# Patient Record
Sex: FEMALE | Race: WHITE | Employment: STUDENT | URBAN - METROPOLITAN AREA
[De-identification: names, ages, dates, MRNs, and addresses within clinical notes are randomized per-mention and may not be internally consistent; named-entity substitution may affect disease eponyms.]

---

## 2020-09-01 ENCOUNTER — HOSPITAL ENCOUNTER (EMERGENCY)
Age: 20
Discharge: HOME | End: 2020-09-01
Attending: STUDENT IN AN ORGANIZED HEALTH CARE EDUCATION/TRAINING PROGRAM
Payer: COMMERCIAL

## 2020-09-01 VITALS
SYSTOLIC BLOOD PRESSURE: 134 MMHG | HEART RATE: 87 BPM | TEMPERATURE: 98 F | OXYGEN SATURATION: 99 % | WEIGHT: 114.4 LBS | RESPIRATION RATE: 16 BRPM | DIASTOLIC BLOOD PRESSURE: 88 MMHG

## 2020-09-01 PROCEDURE — 99282 EMERGENCY DEPT VISIT SF MDM: CPT

## 2020-09-01 PROCEDURE — 6360000002 HC RX W HCPCS: Performed by: STUDENT IN AN ORGANIZED HEALTH CARE EDUCATION/TRAINING PROGRAM

## 2020-09-01 PROCEDURE — 90375 RABIES IG IM/SC: CPT | Performed by: STUDENT IN AN ORGANIZED HEALTH CARE EDUCATION/TRAINING PROGRAM

## 2020-09-01 PROCEDURE — 6360000002 HC RX W HCPCS

## 2020-09-01 PROCEDURE — 90375 RABIES IG IM/SC: CPT

## 2020-09-01 PROCEDURE — 90471 IMMUNIZATION ADMIN: CPT | Performed by: STUDENT IN AN ORGANIZED HEALTH CARE EDUCATION/TRAINING PROGRAM

## 2020-09-01 PROCEDURE — 90675 RABIES VACCINE IM: CPT | Performed by: STUDENT IN AN ORGANIZED HEALTH CARE EDUCATION/TRAINING PROGRAM

## 2020-09-01 PROCEDURE — 96372 THER/PROPH/DIAG INJ SC/IM: CPT

## 2020-09-01 RX ADMIN — RABIES IMMUNE GLOBULIN (HUMAN) 1035 UNITS: 300 INJECTION, SOLUTION INFILTRATION; INTRAMUSCULAR at 21:19

## 2020-09-01 RX ADMIN — Medication 1 ML: at 21:16

## 2020-09-01 ASSESSMENT — ENCOUNTER SYMPTOMS
VOMITING: 0
NAUSEA: 0

## 2020-09-01 NOTE — ED NOTES
Pt states that she had a bat in her house, her PCP wants them to be vaccinated      Marlyn Salgado RN  09/01/20 1926

## 2020-09-01 NOTE — ED PROVIDER NOTES
[address] [address]  Dept: [phone]  Loc: [phone]  eMERGENCYdEPARTMENT eNCOUnter      Pt Name: Felix Au  MRN: [id number]  Nellygfmarta 2000  Date of evaluation: 9/1/2020  Provider:Liset Fan PA-C    CHIEF COMPLAINT       Chief Complaint   Patient presents with    Other     needs rabies treatment      HISTORY OF PRESENT ILLNESS  (Location/Symptom, Timing/Onset, Context/Setting, Quality, Duration,Modifying Factors, Severity.)   Felix Au is a 23 y.o. female who presents to the emergency department by private vehicle requesting treatment for rabies exposure. Patient states there was a bat in her house for couple days over the weekend. They were able to catch and release the back. She denies any known bite from that. She contacted the health department and her PCP. Both parties directed her to the emergency department to be treated for rabies exposure. Patient feels well otherwise. No other complaints at this time. Nursing Notes were reviewedand agreed with or any disagreements were addressed in the HPI. REVIEW OF SYSTEMS    (2-9 systems for level 4, 10 or more for level 5)     Review of Systems   Constitutional: Negative for chills and fever. HENT: Negative. Gastrointestinal: Negative for nausea and vomiting. Musculoskeletal: Negative for arthralgias and myalgias. Skin: Negative for wound. Neurological: Negative. Psychiatric/Behavioral: Negative for behavioral problems and confusion. Except as noted above the remainder of the review of systems was reviewed and negative. PAST MEDICAL HISTORY   History reviewed. No pertinent past medical history. SURGICAL HISTORY     History reviewed. No pertinent surgical history. CURRENT MEDICATIONS     [unfilled]    ALLERGIES     Patient has no known allergies. FAMILY HISTORY     History reviewed.  No pertinent family history. No family status information on file. SOCIAL HISTORY      reports that she has never smoked. She has never used smokeless tobacco.    PHYSICAL EXAM    (up to 7 for level 4, 8 or more for level 5)     ED Triage Vitals [09/01/20 1916]   Enc Vitals Group      /88      Pulse 87      Resp 14      Temp 98.6 °F (37 °C)      Temp Source Oral      SpO2 99 %      Weight       Height       Head Circumference       Peak Flow       Pain Score       Pain Loc       Pain Edu? Excl. in [address]? Physical Exam  Vitals signs reviewed. Constitutional:       Appearance: Normal appearance. HENT:      Head: Normocephalic and atraumatic. Musculoskeletal: Normal range of motion. Skin:     General: Skin is warm. Neurological:      General: No focal deficit present. Mental Status: She is alert and oriented to person, place, and time. Psychiatric:         Mood and Affect: Mood normal.         Behavior: Behavior normal.           DIAGNOSTIC RESULTS     EKG: All EKG's are interpreted by the Emergency Department Physician who either signs or Co-signs this chart in the absence of a cardiologist.    RADIOLOGY:   Non-plain film images such as CT, Ultrasound and MRI are read by the radiologist. Plain radiographic images are visualized and preliminarilyinterpreted by the emergency physician with the below findings:    Interpretation per the Radiologist below,if available at the time of this note:    No orders to display         LABS:  Labs Reviewed - No data to display    All other labs were within normal range or not returned as of this dictation. EMERGENCY DEPARTMENT COURSE and DIFFERENTIAL DIAGNOSIS/MDM:   Vitals:    Vitals:    09/01/20 1916   BP: 134/88   Pulse: 87   Resp: 14   Temp: 98.6 °F (37 °C)   TempSrc: Oral   SpO2: 99%       MDM     Patient presents ED with HPI noted above. She is hemodynamically stable, afebrile and nontoxic-appearing.   She is hypoxic with oxygen saturation of 99% on room air. Patient will need postexposure prophylaxis for rabies. There was a bat for prolonged period and there resident she resides in. We do not have rabies vaccine or immunoglobulin at this department. Patient directed over to [facility] for further definitive treatment. I spoke with Dr. Dina Guzmán at [facility] who kindly accepted report. Patient transferred in stable condition. Patient transferred by private vehicle. CONSULTS:  None    PROCEDURES:  Procedures    FINAL IMPRESSION      1. Need for prophylactic vaccination and inoculation against rabies          DISPOSITION/PLAN   [unfilled]    PATIENT REFERRED TO:   [address] [address] [phone]  Go to   Immediately for further treatment.       DISCHARGE MEDICATIONS:  New Prescriptions    No medications on file       (Please note that portions of this note were completed with a voice recognition program.  Efforts were made to edit the dictations but occasionally words are mis-transcribed.)    [facility]          [address] [place] [address]  09/01/20 3017

## 2020-09-02 NOTE — ED PROVIDER NOTES
HPI:    Patient ID: Kirill Jacobs is a 38year old female. HPI  Patient presents with:  Routine Physical: + pap     States she has gained a bunch of weight  She has never been this heavy. She smokes about 5 cigarettes daily.   She also uses regu /88   Pulse 87   Temp 98 °F (36.7 °C) (Oral)   Resp 16   Wt 114 lb 6.4 oz (51.9 kg)   SpO2 99%      CONSTITUTIONAL: Well appearing, in no acute distress   HEAD: atraumatic, normocephalic   EYES: PERRL, No injection, discharge or scleral icterus. ENT: Moist mucous membranes. NECK: Normal ROM, NO LAD   CARDIOVASCULAR: Regular rate and rhythm. No murmurs or gallop. PULMONARY/CHEST: Airway patent. No retractions. Breath sounds clear with good air entry bilaterally. ABDOMEN: Soft, Non-distended and non-tender, without guarding or rebound. SKIN: Acyanotic, warm, dry   MUSCULOSKELETAL: No swelling, tenderness or deformity   NEUROLOGICAL: Awake and oriented x 3. Pulses intact. Grossly nonfocal   Nursing note and vitals reviewed. ED Course & Medical Decision Making   Medications   rabies vaccine, PCEC (RABAVERT) injection 1 mL (1 mL Intramuscular Given 9/1/20 2116)   rabies immune globulin (HYPERRAB) 1500 UNIT/5ML injection 1,035 Units (1,035 Units Intramuscular Given 9/1/20 2119)      Labs Reviewed - No data to display   No orders to display      PROCEDURES:   Procedures    ASSESSMENT AND PLAN:  Macario Nelson is a 23 y.o. female hospital from [address] [place] [address] with bat exposure. She was given rabies shot as well as rabies globin. She was recommended to come back day 3-day 7, day 14. ClINICAL IMPRESSION:  1. Need for prophylactic vaccination and inoculation against rabies          PATIENT REFERRED TO:  [address]  [address] [address] [phone]  Go to   Immediately for further treatment. DISCHARGE MEDICATIONS:  There are no discharge medications for this patient. DISCONTINUED MEDICATIONS:  There are no discharge medications for this patient. DISPOSITION Decision To Discharge 09/01/2020 10:11:20 PM  -Findings and recommendations explained to patient, and mom. They expressed understanding and agreed with the plan.   Rojelio Neff MD decreased concentration, self-injury, sleep disturbance and suicidal ideas. The patient is not nervous/anxious.         /82   Pulse 82   Temp 99.5 °F (37.5 °C) (Temporal)   Ht 5' 1\" (1.549 m)   Wt 251 lb (113.9 kg)   LMP 06/18/2020 (Exact Date)   BMI (electronically signed)  9/7/2020  _________________________________________________________________________________________  _________________________________________________________________________________________  This record is transcribed utilizing voice recognition technology. There are inherent limitations in this technology. In addition, there may be limitations in editing of this report. If there are any discrepancies, please contact me directly.          Deja Chapa MD  09/07/20 6352       Melania Reynolds MD  09/07/20 1281 abused: Not on file        Physically abused: Not on file        Forced sexual activity: Not on file    Other Topics      Concerns:        Not on file    Social History Narrative      Not on file    Family History   Problem Relation Age of Onset   • Diabet left labia. Cervix exhibits no motion tenderness, no discharge and no friability. Right adnexum displays no mass, no tenderness and no fullness. Left adnexum displays no mass, no tenderness and no fullness. No vaginal discharge.       Genitourinary Comme requested or ordered in this encounter       Imaging & Referrals:  BARIATRICS - INTERNAL       FK#[id number]

## 2020-09-04 ENCOUNTER — HOSPITAL ENCOUNTER (OUTPATIENT)
Dept: INFUSION THERAPY | Age: 20
Setting detail: INFUSION SERIES
Discharge: HOME | End: 2020-09-04
Payer: COMMERCIAL

## 2020-09-04 VITALS
OXYGEN SATURATION: 98 % | HEART RATE: 91 BPM | RESPIRATION RATE: 16 BRPM | TEMPERATURE: 98.3 F | SYSTOLIC BLOOD PRESSURE: 109 MMHG | DIASTOLIC BLOOD PRESSURE: 76 MMHG

## 2020-09-04 DIAGNOSIS — Z20.3 RABIES EXPOSURE: Primary | ICD-10-CM

## 2020-09-04 PROCEDURE — 90471 IMMUNIZATION ADMIN: CPT | Performed by: STUDENT IN AN ORGANIZED HEALTH CARE EDUCATION/TRAINING PROGRAM

## 2020-09-04 PROCEDURE — 90675 RABIES VACCINE IM: CPT | Performed by: STUDENT IN AN ORGANIZED HEALTH CARE EDUCATION/TRAINING PROGRAM

## 2020-09-04 PROCEDURE — 6360000002 HC RX W HCPCS: Performed by: STUDENT IN AN ORGANIZED HEALTH CARE EDUCATION/TRAINING PROGRAM

## 2020-09-04 PROCEDURE — 96372 THER/PROPH/DIAG INJ SC/IM: CPT

## 2020-09-04 RX ORDER — DIPHENHYDRAMINE HYDROCHLORIDE 50 MG/ML
50 INJECTION INTRAMUSCULAR; INTRAVENOUS ONCE
Status: CANCELLED | OUTPATIENT
Start: 2020-09-08

## 2020-09-04 RX ORDER — SODIUM CHLORIDE 9 MG/ML
INJECTION, SOLUTION INTRAVENOUS CONTINUOUS
Status: CANCELLED | OUTPATIENT
Start: 2020-09-08

## 2020-09-04 RX ORDER — METHYLPREDNISOLONE SODIUM SUCCINATE 125 MG/2ML
125 INJECTION, POWDER, LYOPHILIZED, FOR SOLUTION INTRAMUSCULAR; INTRAVENOUS ONCE
Status: CANCELLED | OUTPATIENT
Start: 2020-09-04

## 2020-09-04 RX ORDER — EPINEPHRINE 1 MG/ML
0.3 INJECTION, SOLUTION, CONCENTRATE INTRAVENOUS PRN
Status: CANCELLED | OUTPATIENT
Start: 2020-09-08

## 2020-09-04 RX ORDER — SODIUM CHLORIDE 9 MG/ML
INJECTION, SOLUTION INTRAVENOUS CONTINUOUS
Status: CANCELLED | OUTPATIENT
Start: 2020-09-04

## 2020-09-04 RX ORDER — EPINEPHRINE 1 MG/ML
0.3 INJECTION, SOLUTION, CONCENTRATE INTRAVENOUS PRN
Status: CANCELLED | OUTPATIENT
Start: 2020-09-04

## 2020-09-04 RX ORDER — DIPHENHYDRAMINE HYDROCHLORIDE 50 MG/ML
50 INJECTION INTRAMUSCULAR; INTRAVENOUS ONCE
Status: CANCELLED | OUTPATIENT
Start: 2020-09-04

## 2020-09-04 RX ORDER — METHYLPREDNISOLONE SODIUM SUCCINATE 125 MG/2ML
125 INJECTION, POWDER, LYOPHILIZED, FOR SOLUTION INTRAMUSCULAR; INTRAVENOUS ONCE
Status: CANCELLED | OUTPATIENT
Start: 2020-09-08

## 2020-09-04 RX ADMIN — Medication 1 ML: at 14:00

## 2020-09-04 NOTE — PROGRESS NOTES
Outpatient [address] [facility]    Rabies Series Injection Visit    NAME:  Ginna Rojas  YOB: 2000  MEDICAL RECORD NUMBER:  [id number]  DATE:  9/4/2020    Patient arrived to [facility]   [] per wheelchair   [x] ambulatory     Patient Active Problem List   Diagnosis    Onycholysis of toenail       Date of exposure:   August 29, 2020. Type of exposure:  []  Animal bite                                 [x]  Other, Exposure to a bat in the house for a couple days    Date of first treatment is Day 0. Date of first treatment:  September 1, 2020Where was patient first treated: [facility]                                 ? RABIES POST-EXPOSURE PROPHYLAXIS (HIGH RISK); rabies vaccine 1 mL IM X 4 doses   Doses to be administered on day 3-5, 7-10, 14-17, 28-31.     ? RABIES POST-EXPOSURE PROPHYLAXIS (LOW RISK) rabies vaccine 1 mL IM X 3 doses   Doses to be administered on day 3-5, 7-10, 14-17. Today's dose is Day 3      Any injection site reactions from previous injection: No   []  Pain    []  Swelling   []  Redness   []  Itching     Any systemic reactions from exposure:  No   []  Muscle or joint pain   []  Fever   []  Headache   []  Tired   []  Dizzines   []   Nausea   []   Other     Does patient have any active infection or illness:  No    Is patient Immunocomprised due to disease process ( Cancer, HIV, Aids):  No    Is patient receiving any treatments that can weaken immune system ( chemotherapy, radiation, steroids ): No       Today's dose is Day 3                          Rabies Vaccine dosage: 1  was administered slowly IM into the right upper arm. Response to treatment:  Well tolerated by patient.      Scheduled to return for next injection on September 10,  ,2020     Electronically signed by Zenaida Barahona RN on 9/4/2020 at 1:26 PM

## 2020-09-10 ENCOUNTER — HOSPITAL ENCOUNTER (OUTPATIENT)
Dept: INFUSION THERAPY | Age: 20
Setting detail: INFUSION SERIES
Discharge: HOME | End: 2020-09-10
Payer: COMMERCIAL

## 2020-09-10 VITALS
HEART RATE: 88 BPM | TEMPERATURE: 97.2 F | RESPIRATION RATE: 16 BRPM | DIASTOLIC BLOOD PRESSURE: 81 MMHG | SYSTOLIC BLOOD PRESSURE: 117 MMHG

## 2020-09-10 DIAGNOSIS — Z20.3 RABIES EXPOSURE: Primary | ICD-10-CM

## 2020-09-10 PROCEDURE — 6360000002 HC RX W HCPCS: Performed by: STUDENT IN AN ORGANIZED HEALTH CARE EDUCATION/TRAINING PROGRAM

## 2020-09-10 PROCEDURE — 90675 RABIES VACCINE IM: CPT | Performed by: STUDENT IN AN ORGANIZED HEALTH CARE EDUCATION/TRAINING PROGRAM

## 2020-09-10 PROCEDURE — 90471 IMMUNIZATION ADMIN: CPT | Performed by: STUDENT IN AN ORGANIZED HEALTH CARE EDUCATION/TRAINING PROGRAM

## 2020-09-10 RX ORDER — METHYLPREDNISOLONE SODIUM SUCCINATE 125 MG/2ML
125 INJECTION, POWDER, LYOPHILIZED, FOR SOLUTION INTRAMUSCULAR; INTRAVENOUS ONCE
Status: CANCELLED | OUTPATIENT
Start: 2020-09-15

## 2020-09-10 RX ORDER — EPINEPHRINE 1 MG/ML
0.3 INJECTION, SOLUTION, CONCENTRATE INTRAVENOUS PRN
Status: CANCELLED | OUTPATIENT
Start: 2020-09-15

## 2020-09-10 RX ORDER — DIPHENHYDRAMINE HYDROCHLORIDE 50 MG/ML
50 INJECTION INTRAMUSCULAR; INTRAVENOUS ONCE
Status: CANCELLED | OUTPATIENT
Start: 2020-09-15

## 2020-09-10 RX ORDER — SODIUM CHLORIDE 9 MG/ML
INJECTION, SOLUTION INTRAVENOUS CONTINUOUS
Status: CANCELLED | OUTPATIENT
Start: 2020-09-15

## 2020-09-10 RX ADMIN — RABIES VACCINE 1 ML: KIT at 08:53

## 2020-09-10 NOTE — PROGRESS NOTES
Outpatient [address] [address]    Rabies Series Injection Visit    NAME:  Paul Hoyt  YOB: 2000  MEDICAL RECORD NUMBER:  [id number]  DATE:  9/10/2020    Patient arrived to [facility]   [] per wheelchair   [x] ambulatory     Alert and oriented X 4. Denies any new s/s post exposure. Mild pain at last injection site. Denies new s/s of infection or increase in respiratory distress. Afebrile. Wearing face mask to prevent the spread of COVID-19. Patient Active Problem List   Diagnosis    Onycholysis of toenail    Rabies exposure       Date of exposure:   August 29, 2020. Type of exposure:  []  Animal bite                                 [x]  Other, exposure to bat in house for a couple of days    Date of first treatment is Day 0. Date of first treatment:  September 1, 2020. Where was patient first treated:  [address] [place] [address] ED                                 ? RABIES POST-EXPOSURE PROPHYLAXIS (HIGH RISK); rabies vaccine 1 mL IM X 4 doses   Doses to be administered on day 3-5, 7-10, 14-17, 28-31. X? RABIES POST-EXPOSURE PROPHYLAXIS (LOW RISK) rabies vaccine 1 mL IM X 3 doses   Doses to be administered on day 3-5, 7-10, 14-17. Today's dose is Day 10     Any injection site reactions from previous injection: Yes    [x]  Pain (mild)   []  Swelling   []  Redness   []  Itching     Any systemic reactions from exposure:  No   []  Muscle or joint pain   []  Fever   []  Headache   []  Tired   []  Dizzines   []   Nausea   []   Other     Does patient have any active infection or illness:  No    Is patient Immunocomprised due to disease process ( Cancer, HIV, Aids):  No    Is patient receiving any treatments that can weaken immune system ( chemotherapy, radiation, steroids ): No       Today's dose is Day 10                          Rabies Vaccine dosage: 1 ml  was administered slowly IM into the right upper arm.       Response to treatment:  Well tolerated by patient. Scheduled to return for next injection on September 15,  , 2020 . Maricarmen Koroma      Electronically signed by Mel Ashby RN on 9/10/2020 at 8:42 AM

## 2020-09-15 ENCOUNTER — HOSPITAL ENCOUNTER (OUTPATIENT)
Dept: INFUSION THERAPY | Age: 20
Setting detail: INFUSION SERIES
Discharge: HOME | End: 2020-09-15
Payer: COMMERCIAL

## 2020-09-15 VITALS
HEIGHT: 66 IN | HEART RATE: 91 BPM | RESPIRATION RATE: 17 BRPM | WEIGHT: 110 LBS | TEMPERATURE: 98.2 F | SYSTOLIC BLOOD PRESSURE: 104 MMHG | DIASTOLIC BLOOD PRESSURE: 70 MMHG | OXYGEN SATURATION: 99 % | BODY MASS INDEX: 17.68 KG/M2

## 2020-09-15 DIAGNOSIS — Z20.3 RABIES EXPOSURE: Primary | ICD-10-CM

## 2020-09-15 PROCEDURE — 6360000002 HC RX W HCPCS: Performed by: STUDENT IN AN ORGANIZED HEALTH CARE EDUCATION/TRAINING PROGRAM

## 2020-09-15 PROCEDURE — 96372 THER/PROPH/DIAG INJ SC/IM: CPT

## 2020-09-15 PROCEDURE — 90471 IMMUNIZATION ADMIN: CPT | Performed by: STUDENT IN AN ORGANIZED HEALTH CARE EDUCATION/TRAINING PROGRAM

## 2020-09-15 PROCEDURE — 90675 RABIES VACCINE IM: CPT | Performed by: STUDENT IN AN ORGANIZED HEALTH CARE EDUCATION/TRAINING PROGRAM

## 2020-09-15 RX ORDER — EPINEPHRINE 1 MG/ML
0.3 INJECTION, SOLUTION, CONCENTRATE INTRAVENOUS PRN
Status: CANCELLED | OUTPATIENT
Start: 2020-09-15

## 2020-09-15 RX ORDER — SODIUM CHLORIDE 9 MG/ML
INJECTION, SOLUTION INTRAVENOUS CONTINUOUS
Status: CANCELLED | OUTPATIENT
Start: 2020-09-15

## 2020-09-15 RX ORDER — METHYLPREDNISOLONE SODIUM SUCCINATE 125 MG/2ML
125 INJECTION, POWDER, LYOPHILIZED, FOR SOLUTION INTRAMUSCULAR; INTRAVENOUS ONCE
Status: CANCELLED | OUTPATIENT
Start: 2020-09-15

## 2020-09-15 RX ORDER — DIPHENHYDRAMINE HYDROCHLORIDE 50 MG/ML
50 INJECTION INTRAMUSCULAR; INTRAVENOUS ONCE
Status: CANCELLED | OUTPATIENT
Start: 2020-09-15

## 2020-09-15 RX ADMIN — RABIES VACCINE 1 ML: KIT at 08:22

## 2020-09-15 NOTE — PROGRESS NOTES
Outpatient [address] [facility]    Rabies Series Injection Visit    NAME:  Mary Noriega OF BIRTH:  2000  MEDICAL RECORD NUMBER:  [id number]  DATE:  9/15/2020    Patient arrived to [facility]   [] per wheelchair   [x] ambulatory     Patient Active Problem List   Diagnosis    Onycholysis of toenail    Rabies exposure       Date of exposure:   August 29, 2020. Type of exposure:  []  Animal bite                                 []  Other    Date of first treatment is Day 0. Date of first treatment:  September 1, 2020Where was patient first treated:  [facility]                                  ? RABIES POST-EXPOSURE PROPHYLAXIS (HIGH RISK); rabies vaccine 1 mL IM X 4 doses   Doses to be administered on day 3-5, 7-10, 14-17, 28-31.     ? RABIES POST-EXPOSURE PROPHYLAXIS (LOW RISK) rabies vaccine 1 mL IM X 3 doses   Doses to be administered on day 3-5, 7-10, 14-17. Today's dose is Day third      Any injection site reactions from previous injection: No   []  Pain    []  Swelling   []  Redness   []  Itching     Any systemic reactions from exposure:  No   []  Muscle or joint pain   []  Fever   []  Headache   []  Tired   []  Dizzines   []   Nausea   []   Other     Does patient have any active infection or illness:  No    Is patient Immunocomprised due to disease process ( Cancer, HIV, Aids):  No    Is patient receiving any treatments that can weaken immune system ( chemotherapy, radiation, steroids ): No       Today's dose is Day 3 and last                          Rabies Vaccine dosage: 1ml  was administered slowly IM into the right upper arm. Response to treatment:  Well tolerated by patient. Last dose.       Electronically signed by Jayden Ramirez RN on 9/15/2020 at 8:08 AM